# Patient Record
Sex: MALE | Race: WHITE | Employment: UNEMPLOYED | URBAN - METROPOLITAN AREA
[De-identification: names, ages, dates, MRNs, and addresses within clinical notes are randomized per-mention and may not be internally consistent; named-entity substitution may affect disease eponyms.]

---

## 2024-03-09 ENCOUNTER — HOSPITAL ENCOUNTER (EMERGENCY)
Age: 49
Discharge: HOME | End: 2024-03-09
Attending: STUDENT IN AN ORGANIZED HEALTH CARE EDUCATION/TRAINING PROGRAM

## 2024-03-09 VITALS
HEART RATE: 79 BPM | BODY MASS INDEX: 25.43 KG/M2 | DIASTOLIC BLOOD PRESSURE: 92 MMHG | TEMPERATURE: 97.9 F | RESPIRATION RATE: 16 BRPM | HEIGHT: 67 IN | WEIGHT: 162 LBS | SYSTOLIC BLOOD PRESSURE: 155 MMHG | OXYGEN SATURATION: 97 %

## 2024-03-09 DIAGNOSIS — K02.9 INFECTED DENTAL CARIES: Primary | ICD-10-CM

## 2024-03-09 DIAGNOSIS — K04.7 INFECTED DENTAL CARIES: Primary | ICD-10-CM

## 2024-03-09 PROCEDURE — 99283 EMERGENCY DEPT VISIT LOW MDM: CPT

## 2024-03-09 RX ORDER — AMOXICILLIN AND CLAVULANATE POTASSIUM 875; 125 MG/1; MG/1
1 TABLET, FILM COATED ORAL 2 TIMES DAILY
Qty: 14 TABLET | Refills: 0 | Status: SHIPPED | OUTPATIENT
Start: 2024-03-09 | End: 2024-03-16

## 2024-03-09 RX ORDER — CHLORHEXIDINE GLUCONATE ORAL RINSE 1.2 MG/ML
15 SOLUTION DENTAL 2 TIMES DAILY
Qty: 420 ML | Refills: 0 | Status: SHIPPED | OUTPATIENT
Start: 2024-03-09 | End: 2024-03-23

## 2024-03-09 ASSESSMENT — PAIN DESCRIPTION - LOCATION: LOCATION: TEETH

## 2024-03-09 ASSESSMENT — PAIN SCALES - GENERAL: PAINLEVEL_OUTOF10: 6

## 2024-03-09 ASSESSMENT — PAIN DESCRIPTION - PAIN TYPE: TYPE: ACUTE PAIN

## 2024-03-09 ASSESSMENT — PAIN DESCRIPTION - DESCRIPTORS: DESCRIPTORS: DULL;ACHING;THROBBING

## 2024-03-09 NOTE — ED PROVIDER NOTES
Chief complaint  Vincent Ribeiro is a 48 y.o. male who presents to the Emergency Department with a chief complaint of dental pain  .     HPI  Vincent Ribeiro complains of dental pain that started several days ago; the pain is Subacute. The pain is moderate, aching, and does radiate to the head.    ROS  Fever, eye pain, vision changes, sore throat, dysphagia, and odynophagia are absent.  Neck pain, swelling, and stiffness are absent.  Shortness of breath, chest pain, vomiting, numbness, and weakness are absent.  Otherwise, 6 systems have been reviewed and are negative except as described in the history of present illness.      I have reviewed the following from the nursing documentation:      Prior to Admission medications    Not on File       Allergies as of 03/09/2024    (No Known Allergies)       No past medical history on file.     Surgical History: No past surgical history on file.     Family History: No family history on file.     Social History     Socioeconomic History    Marital status: Single     Spouse name: Not on file    Number of children: Not on file    Years of education: Not on file    Highest education level: Not on file   Occupational History    Not on file   Tobacco Use    Smoking status: Every Day     Current packs/day: 1.00     Types: Cigarettes    Smokeless tobacco: Not on file   Substance and Sexual Activity    Alcohol use: Yes     Comment: occasionally    Drug use: No    Sexual activity: Not on file   Other Topics Concern    Not on file   Social History Narrative    Not on file     Social Determinants of Health     Financial Resource Strain: Not on file   Food Insecurity: Not on file   Transportation Needs: Not on file   Physical Activity: Not on file   Stress: Not on file   Social Connections: Not on file   Intimate Partner Violence: Not on file   Housing Stability: Not on file         Physical Exam  GENERAL APPEARANCE: Vincent Ribeiro is in no acute respiratory distress. Awake and alert, oriented to  person, place, and time.  VITAL SIGNS:   ED Triage Vitals [03/09/24 0539]   Enc Vitals Group      BP (!) 155/92      Pulse 79      Respirations 16      Temp 97.9 °F (36.6 °C)      Temp Source Oral      SpO2 97 %      Weight - Scale 73.5 kg (162 lb)      Height 1.702 m (5' 7\")      Head Circumference       Peak Flow       Pain Score       Pain Loc       Pain Edu?       Excl. in GC?      HEAD/EYES:  Facial edema and erythema are absent.  Extraocular muscles are intact.  Pupils are equal, round, and reactive to light.  MOUTH & OROPHARYNX:  Several mandibular teeth are tender; dental caries are present.  Lingual edema, erythema, and cyanosis are absent.  Facial droop and trismus are absent.  Salivary gland tenderness, edema, and abscesses are absent. Tongue is in midline without edema, erythema, exudates, or protrusion.  The floor of the mouth is not tender; palpable fluid collections are absent.  Gingival abscesses, edema, erythema, and exudates are absent.  Pharyngeal, tonsillar, and uvular erythema, edema, and exudates are absent.  Peritonsillar and retropharyngeal abscess and edema are absent.       NECK:  Supple and not tender.  Meningismus and brawny edema are absent.  RESPIRATORY:  Breathing comfortably with no stridor or tripoding.  NEUROLOGICAL: Awake, alert and oriented to person, place, and time. Cranial nerves 2-12 are intact.  Ataxia is absent.  IMMUNOLOGICAL:  Palpable lymphadenopathy and lymphatic streaking are absent.  DERMATOLOGIC:  Petechiae, rashes, and ecchymoses are absent.  Cyanosis and pallor are absent.  Skin temperature is normal. Lacerations and abrasions are absent. No evidence of foreign bodies.        MDM:    5:45 AM Vincent Ribeiro presents with dental caries, no identifiable drainable abscess on exam.  He denies dysphagia, dyspnea, neck stiffness, and odynophagia.  Further, there's no brawny edema, uvular deviation, menigismus, stridor, trismus, or drooling.  I don't see evidence for a deep

## 2024-09-05 NOTE — ED NOTES
HISTORY OF PRESENT ILLNESS:   Daren Winchester is a 39 year old male with a chronic lifelong history of bilateral eustachian tube dysfunction right greater than left.  Patient has had several sets of ear tubes throughout his lifetime.  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 Since then he has had intermittent otorrhea in his right ear which occurred recently and has resolved with doxycycline and ear drops.  The patient does notice a decline in his hearing in his right ear.  He states the hearing will fluctuate at times but for the most part remains stable.      I have reviewed my nurse's/assistant's note for PMHX, PSURGHX, FAMHX, SOCHX, MEDS, ALL, and ROS and I agree.    PHYSICAL EXAMINATION:   CONSTITUTIONAL:   Visit Vitals  Temp 97.9 °F (36.6 °C)   Ht 5' 11\" (1.803 m)   Wt 117.9 kg   BMI 36.26 kg/m²     Well developed.  PSYCHIATRIC: Appropriate mood & affect.  A&O to person and surroundings.  SKIN:  No concerning facial skin or subcutaneous lesions seen or palpated.  EYES:  Conjunctivae not injected.  Lids without edema. Pupils equal and round.  EARS:             No ear canal lesions.  TM's clear on the left.  Right TM is abnormal and retracted with a posterior retraction pocket.  No ossicular chain is visible.  Inferiorly at the annulus, there is either a remnant ossicle or piece of cartilage that is vertically oriented.  NOSE:             No pus or polyps.  Mouth/Throat: No exudate or concerning lesions.  NECK:  Trachea midline.  No palpable thyroid masses.  LYMPHATIC: No facial lymphadenopathy. No cervical lymphadenopathy.  CARDIOVASCULAR:  No upper extremity edema.  Heart RRR.  RESPIRATORY: No accessory muscle use.  Lungs clear to auscultation bilaterally.    DATA:    Most recent labs reviewed by me.    ASSESSMENT:  1. Chronic bilateral eustachian tube dysfunction right greater than  Pt given AVS and verbalized understanding of instructions. Pt will follow up as indicated. Prescriptions given and medication education completed. Pt ambulated to lobby without assistance, respirations appear even and unlabored. No distress noted.        Wt Readings from Last 3 Encounters:   09/05/24 58.9 kg (129 lb 13.6 oz)   08/13/24 58.1 kg (128 lb)   07/10/24 59 kg (130 lb)     Pt appears euvolemic on exam  BNP 36  Last ECHO 4/2023 EF 66% grade I diastolic dysfunction  Daily weights, I/O  Continue home torsemide   left  2. Status post right canal wall up tympanomastoidectomy with ossicular chain reconstruction.  3. Resolved right acute otitis media/externa    PLAN:  1. Patient will maintain relative water precautions  2. Offered to obtain a CT temporal bone and evaluate his hearing.  Patient has deferred for now.  He states his hearing and intermittent drainage has not worsened and would prefer continued observation.  3. The patient should follow up with me in a year, sooner should problems arise    I would like to thank Tammy L Schladweiler, NP for the kind consult.  Elliot Marshall MD  5/19/2020